# Patient Record
Sex: MALE | Race: BLACK OR AFRICAN AMERICAN
[De-identification: names, ages, dates, MRNs, and addresses within clinical notes are randomized per-mention and may not be internally consistent; named-entity substitution may affect disease eponyms.]

---

## 2018-02-07 ENCOUNTER — HOSPITAL ENCOUNTER (EMERGENCY)
Dept: HOSPITAL 68 - ERH | Age: 23
End: 2018-02-07
Payer: COMMERCIAL

## 2018-02-07 VITALS — BODY MASS INDEX: 25.57 KG/M2 | HEIGHT: 76 IN | WEIGHT: 210 LBS

## 2018-02-07 VITALS — DIASTOLIC BLOOD PRESSURE: 97 MMHG | SYSTOLIC BLOOD PRESSURE: 180 MMHG

## 2018-02-07 DIAGNOSIS — R07.89: Primary | ICD-10-CM

## 2018-02-07 LAB
ABSOLUTE GRANULOCYTE CT: 1.7 /CUMM (ref 1.4–6.5)
BASOPHILS # BLD: 0 /CUMM (ref 0–0.2)
BASOPHILS NFR BLD: 0.5 % (ref 0–2)
EOSINOPHIL # BLD: 0.1 /CUMM (ref 0–0.7)
EOSINOPHIL NFR BLD: 2.8 % (ref 0–5)
ERYTHROCYTE [DISTWIDTH] IN BLOOD BY AUTOMATED COUNT: 12.8 % (ref 11.5–14.5)
GRANULOCYTES NFR BLD: 38.2 % (ref 42.2–75.2)
HCT VFR BLD CALC: 44 % (ref 42–52)
LYMPHOCYTES # BLD: 2.2 /CUMM (ref 1.2–3.4)
MCH RBC QN AUTO: 27.5 PG (ref 27–31)
MCHC RBC AUTO-ENTMCNC: 33.3 G/DL (ref 33–37)
MCV RBC AUTO: 82.5 FL (ref 80–94)
MONOCYTES # BLD: 0.4 /CUMM (ref 0.1–0.6)
PLATELET # BLD: 256 /CUMM (ref 130–400)
PMV BLD AUTO: 7.3 FL (ref 7.4–10.4)
RED BLOOD CELL CT: 5.33 /CUMM (ref 4.7–6.1)
WBC # BLD AUTO: 4.4 /CUMM (ref 4.8–10.8)

## 2018-02-07 NOTE — RADIOLOGY REPORT
EXAMINATION:
XR CHEST
 
CLINICAL INFORMATION:
Chest pain
 
COMPARISON:
None
 
TECHNIQUE:
2 views of the chest were obtained.
 
FINDINGS:
The lungs are well expanded. There is no focal consolidation, edema, or
effusion. No pneumothorax. The cardiomediastinal silhouette is within normal
limits. No acute osseous abnormality.
 
IMPRESSION:
No acute pulmonary findings.

## 2018-02-07 NOTE — ED CARDIAC/CP/PALPITATIONS
**See Addendum**
History of Present Illness
 
General
Chief Complaint: Chest Pain
Stated Complaint: CHEST PAIN
Source: patient
Exam Limitations: no limitations
 
Vital Signs & Intake/Output
Vital Signs & Intake/Output
 Vital Signs
 
 
Date Time Temp Pulse Resp B/P B/P Pulse O2 O2 Flow FiO2
 
     Mean Ox Delivery Rate 
 
 1816 96.0 77 15 180/97  99 Room Air Room Air 
 
 
 
Allergies
Coded Allergies:
No Known Allergies (18)
 
Reconcile Medications
Lisinopril 10 MG TABLET   1 TAB PO DAILY htn
 
Triage Note:
PT TO ED FOR C/C OF CHEST DISCOMFORT THAT STARTED
 ON MONDAY INTERMITTENTLY WITHOUT ASSOCIATED
 SYMPTOMS. DENIES SOB, NUMBNESS, TINGLING.  NOTHING
 MAKES PAIN WORSE OR BETTER.
Triage Nurses Notes Reviewed? yes
Onset: Abrupt
Duration: day(s): (3)
Quality/Severity: sharp
Location: central
Radiation: no radiation
Activities at Onset: none
HPI:
22-year-old male comes into the emergency room with complaints of central chest 
pain has been going on for the past 3 days intermittently.  Patient reports that
he'll get sharp pain that only lasts for 2 seconds.  He reports it only happens 
a couple times a day.  He denies any associated shortness of breath.  He denies 
any vomiting or diaphoresis.  He does admit that he had some chest pain some 
years back but nothing was ever found.  He comes in for further evaluation.  
(Oscar Salgado)
 
Past History
 
Travel History
Traveled to [place] past 21 day No
 
Medical History
Any Pertinent Medical History? see below for history
Neurological: NONE
EENT: NONE
Cardiovascular: NONE
Respiratory: NONE
Gastrointestinal: NONE
Hepatic: NONE
Renal: NONE
Musculoskeletal: NONE
Psychiatric: NONE
Endocrine: NONE
Blood Disorders: NONE
Cancer(s): NONE
GYN/Reproductive: NONE
 
Surgical History
Surgical History: non-contributory
 
Psychosocial History
What is your primary language English
Tobacco Use: Never used
ETOH Use: denies use
Illicit Drug Use: denies illicit drug use
 
Family History
Hx Contributory? No
(Oscar Salgado)
 
Review of Systems
 
Review of Systems
Constitutional:
Reports: no symptoms. 
EENTM:
Reports: no symptoms. 
Respiratory:
Reports: no symptoms. 
Cardiovascular:
Reports: see HPI. 
GI:
Reports: no symptoms. 
Genitourinary:
Reports: no symptoms. 
Musculoskeletal:
Reports: no symptoms. 
Skin:
Reports: no symptoms. 
Neurological/Psychological:
Reports: no symptoms. 
Hematologic/Endocrine:
Reports: no symptoms. 
Immunologic/Allergic:
Reports: no symptoms. 
All Other Systems: Reviewed and Negative
(Oscar Salgado)
 
Physical Exam
 
Physical Exam
General Appearance: well developed/nourished, no apparent distress, alert, awake
Head: atraumatic, normal appearance
Eyes:
Bilateral: normal appearance. 
Ears, Nose, Throat: normal ENT inspection, hearing grossly normal
Neck: normal inspection
Respiratory: normal breath sounds, no respiratory distress
Cardiovascular: regular rate/rhythm
Back: normal inspection
Extremities: normal inspection
Neurologic/Psych: awake, alert, oriented x 3, normal gait, normal mood/affect
Skin: intact, normal color
 
Core Measures
ACS in differential dx? Yes
CVA/TIA Diagnosis No
Sepsis Present: No
Sepsis Focused Exam Completed? No
All Positive = PERC Ruled Out:
Positive: age < 50 years, heart rate < 100 bpm, O2 sat > 94%, no hemoptysis, no 
hormone use, no prior DVT or PE, no unilateral leg swellin, no surgery/trauma w/
in 4w. 
Wells Criteria Score: 0
(Oscar Salgado)
 
Progress
Differential Diagnosis: AMI, aortic dissection, costochondritis, intracranial 
hemorrhage, musculoskeletal pain, myocarditis, pancreatitis, pericarditis, 
pneumonia, pneumothorax, pulmonary embolism
Plan of Care:
 Orders
 
 
Procedure Date/time Status
 
TROPONIN LEVEL  1821 Complete
 
COMPREHENSIVE METABOLIC PANEL  182 Complete
 
CBC WITHOUT DIFFERENTIAL 1821 Complete
 
EKG  1813 Active
 
 
 Laboratory Tests
 
 
18 1831:
Anion Gap 12, Estimated GFR > 60, BUN/Creatinine Ratio 16.7, Glucose 92, Calcium
9.6, Total Bilirubin 0.6, AST 25, ALT 27, Alkaline Phosphatase 77, Troponin I < 
0.01, Total Protein 7.8, Albumin 4.8, Globulin 3.0, Albumin/Globulin Ratio 1.6, 
CBC w Diff NO MAN DIFF REQ, RBC 5.33, MCV 82.5, MCH 27.5, MCHC 33.3, RDW 12.8, 
MPV 7.3  L, Gran % 38.2  L, Lymphocytes % 50.0, Monocytes % 8.5, Eosinophils % 
2.8, Basophils % 0.5, Absolute Granulocytes 1.7, Absolute Lymphocytes 2.2, 
Absolute Monocytes 0.4, Absolute Eosinophils 0.1, Absolute Basophils 0
 
Diagnostic Imaging:
Viewed by Me: Radiology Read.  Discussed w/RAD: Radiology Read. 
Radiology Impression: PATIENT: DMITRIY WHITMAN  MEDICAL RECORD NO: [id number] 
PRESENT AGE: 22  PATIENT ACCOUNT NO: [id number] : 95  LOCATION: [facility] 
ORDERING PHYSICIAN: Oscar PINEDA     SERVICE DATE:  EXAM TYPE
: RAD - XRY-CHEST XRAY, TWO VIEWS EXAMINATION: XR CHEST CLINICAL INFORMATION: 
Chest pain COMPARISON: None TECHNIQUE: 2 views of the chest were obtained. 
FINDINGS: The lungs are well expanded. There is no focal consolidation, edema, 
or effusion. No pneumothorax. The cardiomediastinal silhouette is within normal 
limits. No acute osseous abnormality. IMPRESSION: No acute pulmonary findings. 
DICTATED BY: Damien Rodney MD  DATE/TIME DICTATED:18 
:RAD.[facility]  DATE/TIME TRANSCRIBED:18 CONFIDENTIAL, 
DO NOT COPY WITHOUT APPROPRIATE AUTHORIZATION.  <Electronically signed in Other 
Vendor System>                                                                  
                     SIGNED BY: Damien Rodney MD 18
Initial ED EKG: normal sinus rhythm, rate (61), borderline t wave abormalities
(Lyle PINEDA,Oscar)
Initial ED EKG: rate
(Ivan Alvarado DO)
 
Departure
 
Departure
Disposition: HOME OR SELF CARE
Condition: Stable
Clinical Impression
Primary Impression: Atypical chest pain
Referrals:
Pauline BLAKE,Dennis Mcnamara MD,Rohit (PCP/Family)
 
Additional Instructions:
Follow-up with cardiologist.  Follow-up with your primary care doctor.  Return 
if any other concerns worsening symptoms.
 
Please go over all results of today's visit with your primary care doctor.  
Contact your primary care doctor to let them know you were here in the emergency
room.  There may be nonspecific findings which may not be related to your visit 
today here in the emergency room but may require further evaluation and chronic 
monitoring by your primary care doctor.  If you had a laceration today the 
chance of foreign body always remains. You should follow-up with your primary 
care doctor for recheck in 3-5 days for a wound check.  If you had an x-ray done
there is a chance that a fracture could have been missed on initial read and you
should follow-up with your primary care doctor for repeat x-rays if symptoms 
persist.  If your blood pressure was elevated here in the emergency room please 
have rechecked by [facility] primary care doctor within the next 48.  If you were 
prescribed a narcotic here in the emergency room or any type of controlled 
substances you're not allowed to drive while taking this medication or operate 
any type of heavy machinery.  Narcotics can make you feel lightheaded dizziness 
nausea and can cause constipation.  You may need to  a stool softener.  
Thank you for choosing [facility] emergency room.  Please return to the 
emergency room immediately if you have any other concerns worsening of symptoms.
 
Departure Forms:
Customer Survey
General Discharge Information
Prescriptions:
Current Visit Scripts
Lisinopril 1 TAB PO DAILY  
     #30 TAB 
 
 
Comments
18
 
Patient clinically looks well.  Patient is in no apparent distress.  Patient is 
nontoxic-appearing.  No suspicion for pulmonary embolism.  Chest pain has been 
intermittent for 4-5 days.  Patient was seen by Dr. Alvarado.  Blood pressure 
slightly elevated here in the emergency room.  Apparently he has been elevated 
previously.  Patient was started on a low dose of senna pro-.  return to the 
emergency room if any concerns worsening symptoms.  Patient understands and 
agrees with plan of care.
(Oscar Salgado)
 
PA/NP Co-Sign Statement
Statement:
ED Attending supervision documentation-
 
[x] I saw and evaluated the patient. I have also reviewed all the pertinent lab 
results and diagnostic results. I agree with the findings and the plan of care 
as documented in the PA's/NP's documentation. 
 
[] I have reviewed the ED Record and agree with the PA's/NP's documentation.
 
[] Additions or exceptions (if any) to the PAs/NP's note and plan are 
summarized below:
[]
 
 
EKG unremarkable.  Chest x-ray is normal.  Physical exam essentially 
unremarkable.
(Christiano JOHNSTON,Ivan LOZANO)
 
Critical Care Note
 
Critical Care Note
Critical Care Time: non-applicable
(Oscar Salgado)